# Patient Record
Sex: MALE | Race: WHITE
[De-identification: names, ages, dates, MRNs, and addresses within clinical notes are randomized per-mention and may not be internally consistent; named-entity substitution may affect disease eponyms.]

---

## 2021-02-09 PROBLEM — Z00.00 ENCOUNTER FOR PREVENTIVE HEALTH EXAMINATION: Status: ACTIVE | Noted: 2021-02-09

## 2021-02-16 ENCOUNTER — APPOINTMENT (OUTPATIENT)
Dept: CARDIOLOGY | Facility: CLINIC | Age: 51
End: 2021-02-16
Payer: COMMERCIAL

## 2021-02-16 ENCOUNTER — NON-APPOINTMENT (OUTPATIENT)
Age: 51
End: 2021-02-16

## 2021-02-16 VITALS
HEART RATE: 94 BPM | BODY MASS INDEX: 38.36 KG/M2 | HEIGHT: 69 IN | WEIGHT: 259 LBS | TEMPERATURE: 98 F | OXYGEN SATURATION: 98 % | SYSTOLIC BLOOD PRESSURE: 142 MMHG | DIASTOLIC BLOOD PRESSURE: 100 MMHG

## 2021-02-16 DIAGNOSIS — Z82.49 FAMILY HISTORY OF ISCHEMIC HEART DISEASE AND OTHER DISEASES OF THE CIRCULATORY SYSTEM: ICD-10-CM

## 2021-02-16 DIAGNOSIS — Z87.891 PERSONAL HISTORY OF NICOTINE DEPENDENCE: ICD-10-CM

## 2021-02-16 DIAGNOSIS — Z78.9 OTHER SPECIFIED HEALTH STATUS: ICD-10-CM

## 2021-02-16 DIAGNOSIS — Z80.1 FAMILY HISTORY OF MALIGNANT NEOPLASM OF TRACHEA, BRONCHUS AND LUNG: ICD-10-CM

## 2021-02-16 PROCEDURE — 99244 OFF/OP CNSLTJ NEW/EST MOD 40: CPT

## 2021-02-16 PROCEDURE — 93000 ELECTROCARDIOGRAM COMPLETE: CPT

## 2021-02-16 PROCEDURE — 99072 ADDL SUPL MATRL&STAF TM PHE: CPT

## 2021-02-16 RX ORDER — MESALAMINE 1000 MG/1
1000 SUPPOSITORY RECTAL
Qty: 14 | Refills: 0 | Status: DISCONTINUED | COMMUNITY
Start: 2020-11-03

## 2021-02-16 RX ORDER — AMOXICILLIN AND CLAVULANATE POTASSIUM 875; 125 MG/1; MG/1
875-125 TABLET, COATED ORAL
Qty: 20 | Refills: 0 | Status: DISCONTINUED | COMMUNITY
Start: 2020-11-16

## 2021-02-16 RX ORDER — TADALAFIL 20 MG/1
20 TABLET ORAL
Qty: 6 | Refills: 0 | Status: DISCONTINUED | COMMUNITY
Start: 2020-08-29

## 2021-02-16 RX ORDER — MULTIVITAMIN
TABLET ORAL
Refills: 0 | Status: ACTIVE | COMMUNITY

## 2021-02-16 NOTE — REASON FOR VISIT
[FreeTextEntry1] : GERD is a 50-year-old male with history of overweight, chronic A. fib diagnosed 3 years ago at the [facility] whitecoat syndrome.\par \par 3 years ago, A. fib was diagnosed on EKG, the patient was asymptomatic.  Since then he has been on metoprolol.  Anticoagulation was stopped by [facility] cardiologist since his chads vas score was 0.  He is currently on aspirin.  He does not have any palpitations, dizziness, TIA, CVA\par \par He denies chest pain or shortness of breath on exertion.  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  He denies PND, orthopnea, LE edema and ?Sleep apnea

## 2021-02-16 NOTE — PHYSICAL EXAM
[General Appearance - Well Developed] : well developed [Normal Appearance] : normal appearance [Well Groomed] : well groomed [No Deformities] : no deformities [General Appearance - Well Nourished] : well nourished [General Appearance - In No Acute Distress] : no acute distress [Normal Conjunctiva] : the conjunctiva exhibited no abnormalities [Eyelids - No Xanthelasma] : the eyelids demonstrated no xanthelasmas [Normal Oral Mucosa] : normal oral mucosa [No Oral Pallor] : no oral pallor [No Oral Cyanosis] : no oral cyanosis [Murmurs] : no murmurs present [Arterial Pulses Normal] : the arterial pulses were normal [Edema] : no peripheral edema present [FreeTextEntry1] : S1-S2 regular. [] : no respiratory distress [Respiration, Rhythm And Depth] : normal respiratory rhythm and effort [Exaggerated Use Of Accessory Muscles For Inspiration] : no accessory muscle use [Auscultation Breath Sounds / Voice Sounds] : lungs were clear to auscultation bilaterally [Abnormal Walk] : normal gait [Gait - Sufficient For Exercise Testing] : the gait was sufficient for exercise testing [Nail Clubbing] : no clubbing of the fingernails [Skin Color & Pigmentation] : normal skin color and pigmentation [Skin Turgor] : normal skin turgor [Oriented To Time, Place, And Person] : oriented to person, place, and time [Affect] : the affect was normal [Mood] : the mood was normal [No Anxiety] : not feeling anxious

## 2021-02-16 NOTE — DISCUSSION/SUMMARY
[FreeTextEntry1] : A. fib.  The chads vas score has been presumed to be 0.  Patient was on Eliquis when initially diagnosed with A. fib 3 years ago but this was then discontinued by cardiology at [facility].  Currently, whether the patient has hypertension or not will impact the CHADS2 score and then further anticoagulation management.  Check Holter recording for ventricular rate response especially at nighttime and rule out pauses.\par \par Labile hypertension.  He insists that his blood pressures are all normal when taken while he is at job or at his home.  I have asked him to make a blood pressure log.  The patient is very reluctant to accept that he might have hypertension at this time.  Continue current beta-blockers.  The patient understands that once he has labile hypertension, he is likely to have elevated blood pressure readings on regular basis when exposed to uncomfortable situations.\par \par Check echocardiogram.  Look for hypertensive changes and left atrial size.\par \par Evaluate blood pressure response and heart rate response on beta-blockers with ETT.  Patient does not have any signs or symptoms of CAD\par \par Patient will discuss sleep apnea study with primary care.\par \par Truncal obesity: Dietary changes to reduce total calories and reduction of weight would help.  An exercise program would also be helpful.\par \par He was given a lab slip to check his fasting lipid profile.\par \par Thank you for this referral and allowing me to participate in the care of this patient.  If I can be of any further help or  if you have any questions, please do not hesitate to contact me\par \par \par Sincerely,\par \par Lon Baca MD, FACC, DARION\par

## 2021-03-02 ENCOUNTER — TRANSCRIPTION ENCOUNTER (OUTPATIENT)
Age: 51
End: 2021-03-02

## 2021-03-04 ENCOUNTER — APPOINTMENT (OUTPATIENT)
Dept: CARDIOLOGY | Facility: CLINIC | Age: 51
End: 2021-03-04
Payer: COMMERCIAL

## 2021-03-04 PROCEDURE — 99072 ADDL SUPL MATRL&STAF TM PHE: CPT

## 2021-03-04 PROCEDURE — XXXXX: CPT

## 2021-03-04 PROCEDURE — 93015 CV STRESS TEST SUPVJ I&R: CPT

## 2021-03-04 PROCEDURE — 93306 TTE W/DOPPLER COMPLETE: CPT

## 2021-03-12 ENCOUNTER — APPOINTMENT (OUTPATIENT)
Dept: CARDIOLOGY | Facility: CLINIC | Age: 51
End: 2021-03-12
Payer: COMMERCIAL

## 2021-03-16 PROCEDURE — 93224 XTRNL ECG REC UP TO 48 HRS: CPT

## 2021-03-16 PROCEDURE — 99072 ADDL SUPL MATRL&STAF TM PHE: CPT

## 2021-03-31 ENCOUNTER — NON-APPOINTMENT (OUTPATIENT)
Age: 51
End: 2021-03-31

## 2021-04-13 ENCOUNTER — APPOINTMENT (OUTPATIENT)
Dept: CARDIOLOGY | Facility: CLINIC | Age: 51
End: 2021-04-13
Payer: COMMERCIAL

## 2021-04-13 VITALS
DIASTOLIC BLOOD PRESSURE: 98 MMHG | OXYGEN SATURATION: 97 % | SYSTOLIC BLOOD PRESSURE: 142 MMHG | WEIGHT: 255 LBS | BODY MASS INDEX: 37.77 KG/M2 | HEIGHT: 69 IN | HEART RATE: 97 BPM

## 2021-04-13 VITALS — SYSTOLIC BLOOD PRESSURE: 148 MMHG | DIASTOLIC BLOOD PRESSURE: 98 MMHG

## 2021-04-13 DIAGNOSIS — R09.89 OTHER SPECIFIED SYMPTOMS AND SIGNS INVOLVING THE CIRCULATORY AND RESPIRATORY SYSTEMS: ICD-10-CM

## 2021-04-13 DIAGNOSIS — I48.91 UNSPECIFIED ATRIAL FIBRILLATION: ICD-10-CM

## 2021-04-13 PROCEDURE — 99214 OFFICE O/P EST MOD 30 MIN: CPT

## 2021-04-13 PROCEDURE — 99072 ADDL SUPL MATRL&STAF TM PHE: CPT

## 2021-04-13 RX ORDER — APIXABAN 5 MG/1
5 TABLET, FILM COATED ORAL
Qty: 180 | Refills: 1 | Status: ACTIVE | COMMUNITY
Start: 2021-04-13 | End: 1900-01-01

## 2021-04-13 NOTE — ASSESSMENT
[FreeTextEntry1] : Reviewed today:\par -EKG 2/16/2021: A. fib.  non Specific ST-T.\par -Holter recording March 2021 showed A. fib, average heart rate 104.\par -Echocardiogram March 2021: Normal EF.  Aortic root 4.3 cm.\par -ETT March 2021: Exercise only for: 38 minutes on Luca protocol.  Peak blood pressure 150/102.\par -Labs March 2021: , , .

## 2021-04-13 NOTE — DISCUSSION/SUMMARY
[FreeTextEntry1] : A. fib.  The chads vas score is now 1 with hypertension.  Start Eliquis.  Stop aspirin.\par \par Ventricular rate remains high.  Increase metoprolol 150 mg/day preferably in divided doses.\par \par Labile hypertension.  He insists that his blood pressures are all normal when taken while he is at job or at his home.  Reviewed blood pressure log.  His blood pressure is much lower at home.  I have asked him to continue with his blood pressure log.  Nonpharmacologic ways of reducing blood pressure were discussed.  Especially in his case, weight loss.\par \par Echocardiogram was reviewed.  No hypertensive changes however aortic root is dilated.\par \par No evidence or symptoms of CAD\par \par Hyperglycemia.  Check A1c.  Dietary changes to reduce blood sugar was discussed especially reduction of total calories and reduction of foods that are high in glycemic index.\par \par LDL is slightly high.  Is reluctant to start statins.  Dietary modification to reduce saturated fat was recommended.  He likes red meats.\par \par During exercise, aerobic exercise and cardio exercise is preferable.  Avoid heavy weight lifting at all costs.\par \par Follow-up in 3 to 4 months.\par \par Thank you for this referral and allowing me to participate in the care of this patient.  If I can be of any further help or  if you have any questions, please do not hesitate to contact me\par \par \par Sincerely,\par \par Lon Baca MD, FACDEDRICK, DARION\par \par Patient will discuss sleep apnea study with primary care.\par \par Truncal obesity: Dietary changes to reduce total calories and reduction of weight would help.  An exercise program would also be helpful.\par \par He was given a lab slip to check his fasting lipid profile.\par \par Thank you for this referral and allowing me to participate in the care of this patient.  If I can be of any further help or  if you have any questions, please do not hesitate to contact me\par \par \par Sincerely,\par \par Lon Baca MD, FACDEDRICK, DARION\par

## 2021-04-13 NOTE — REASON FOR VISIT
[FreeTextEntry1] : GERD is a 50-year-old male with history of overweight, chronic A. fib diagnosed 3 years ago at the [facility],  whitecoat syndrome.\par \par 3 years ago, A. fib was diagnosed on EKG, the patient was asymptomatic.  Since then he has been on metoprolol.  Antico[facility] was stopped by [facility]ologist since his chads vas score was 0.  He is currently on aspirin.  He does not have any palpitations, dizziness, TIA, CVA.  He now has hypertension and chads vas score of 1.  Also, ventricular rate remains high even though he is on higher doses of beta-blocker by Holter recording.\par \par He denies chest pain or shortness of breath on exertion.  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

## 2021-04-13 NOTE — PHYSICAL EXAM
[General Appearance - Well Developed] : well developed [Normal Appearance] : normal appearance [Well Groomed] : well groomed [General Appearance - Well Nourished] : well nourished [No Deformities] : no deformities [General Appearance - In No Acute Distress] : no acute distress [Normal Conjunctiva] : the conjunctiva exhibited no abnormalities [Eyelids - No Xanthelasma] : the eyelids demonstrated no xanthelasmas [Normal Oral Mucosa] : normal oral mucosa [No Oral Pallor] : no oral pallor [No Oral Cyanosis] : no oral cyanosis [] : no respiratory distress [Respiration, Rhythm And Depth] : normal respiratory rhythm and effort [Exaggerated Use Of Accessory Muscles For Inspiration] : no accessory muscle use [Auscultation Breath Sounds / Voice Sounds] : lungs were clear to auscultation bilaterally [Murmurs] : no murmurs present [Arterial Pulses Normal] : the arterial pulses were normal [Edema] : no peripheral edema present [Abdomen Soft] : soft [FreeTextEntry1] : Obese [Abnormal Walk] : normal gait [Gait - Sufficient For Exercise Testing] : the gait was sufficient for exercise testing [Nail Clubbing] : no clubbing of the fingernails [Skin Color & Pigmentation] : normal skin color and pigmentation [Skin Turgor] : normal skin turgor [Oriented To Time, Place, And Person] : oriented to person, place, and time [Affect] : the affect was normal [Mood] : the mood was normal [No Anxiety] : not feeling anxious

## 2021-08-03 ENCOUNTER — APPOINTMENT (OUTPATIENT)
Dept: CARDIOLOGY | Facility: CLINIC | Age: 51
End: 2021-08-03

## 2021-10-01 RX ORDER — METOPROLOL SUCCINATE 100 MG/1
100 TABLET, EXTENDED RELEASE ORAL
Qty: 135 | Refills: 3 | Status: ACTIVE | COMMUNITY
Start: 2021-02-15 | End: 1900-01-01

## 2021-10-12 ENCOUNTER — APPOINTMENT (OUTPATIENT)
Dept: CARDIOLOGY | Facility: CLINIC | Age: 51
End: 2021-10-12